# Patient Record
(demographics unavailable — no encounter records)

---

## 2019-04-18 DIAGNOSIS — F41.9 ANXIETY: ICD-10-CM

## 2019-04-18 RX ORDER — DOXEPIN HYDROCHLORIDE 10 MG/1
CAPSULE ORAL
Qty: 180 CAPSULE | Refills: 3 | OUTPATIENT
Start: 2019-04-18

## 2019-04-18 NOTE — TELEPHONE ENCOUNTER
"Requested Prescriptions   Pending Prescriptions Disp Refills     doxepin (SINEQUAN) 10 MG capsule [Pharmacy Med Name: DOXEPIN HCL 10 MG CAPSULE] 180 capsule 3     Sig: TAKE 2 CAPSULES BY MOUTH DAILY AT BEDTIME       Tricyclic Antidepressants Protocol Failed - 4/18/2019 11:00 AM        Failed - Blood pressure under 140/90 in past 12 months     BP Readings from Last 3 Encounters:   08/13/14 146/90   01/29/14 158/90   09/26/13 144/86                 Failed - Recent (12 mo) or future (30 days) visit within the authorizing provider's specialty      Patient had office visit in the last 12 months or has a visit in the next 30 days with authorizing provider or within the authorizing provider's specialty.  See \"Patient Info\" tab in inbasket, or \"Choose Columns\" in Meds & Orders section of the refill encounter.              Passed - Medication is active on med list        Passed - Patient is age 18 or older        "

## 2019-04-18 NOTE — TELEPHONE ENCOUNTER
Per chart review, no active prescription on file. Pt's most recent prescription was 2014 for anxiety. Pt's most recent OV 2014. Called pharmacy-- [facility] in [facility] at [phone]. They stated that RX was sent accidentally to our clinic, looks like it is being prescribed by another provider.    RX refused.    Gina Quiroz RN  [facility]